# Patient Record
Sex: FEMALE | Race: WHITE | URBAN - METROPOLITAN AREA
[De-identification: names, ages, dates, MRNs, and addresses within clinical notes are randomized per-mention and may not be internally consistent; named-entity substitution may affect disease eponyms.]

---

## 2021-09-10 ENCOUNTER — OFFICE VISIT (OUTPATIENT)
Dept: URGENT CARE | Age: 71
End: 2021-09-10
Payer: MEDICARE

## 2021-09-10 VITALS — TEMPERATURE: 98 F | HEART RATE: 67 BPM | RESPIRATION RATE: 16 BRPM | OXYGEN SATURATION: 97 %

## 2021-09-10 DIAGNOSIS — R05.9 COUGH: ICD-10-CM

## 2021-09-10 DIAGNOSIS — R09.81 NASAL CONGESTION: Primary | ICD-10-CM

## 2021-09-10 DIAGNOSIS — J32.9 SINUSITIS, UNSPECIFIED CHRONICITY, UNSPECIFIED LOCATION: ICD-10-CM

## 2021-09-10 DIAGNOSIS — Z76.0 MEDICATION REFILL: ICD-10-CM

## 2021-09-10 PROCEDURE — G8427 DOCREV CUR MEDS BY ELIG CLIN: HCPCS | Performed by: NURSE PRACTITIONER

## 2021-09-10 PROCEDURE — 3017F COLORECTAL CA SCREEN DOC REV: CPT | Performed by: NURSE PRACTITIONER

## 2021-09-10 PROCEDURE — G8536 NO DOC ELDER MAL SCRN: HCPCS | Performed by: NURSE PRACTITIONER

## 2021-09-10 PROCEDURE — G8421 BMI NOT CALCULATED: HCPCS | Performed by: NURSE PRACTITIONER

## 2021-09-10 PROCEDURE — G8400 PT W/DXA NO RESULTS DOC: HCPCS | Performed by: NURSE PRACTITIONER

## 2021-09-10 PROCEDURE — 1090F PRES/ABSN URINE INCON ASSESS: CPT | Performed by: NURSE PRACTITIONER

## 2021-09-10 PROCEDURE — 99203 OFFICE O/P NEW LOW 30 MIN: CPT | Performed by: NURSE PRACTITIONER

## 2021-09-10 PROCEDURE — 1101F PT FALLS ASSESS-DOCD LE1/YR: CPT | Performed by: NURSE PRACTITIONER

## 2021-09-10 PROCEDURE — G8432 DEP SCR NOT DOC, RNG: HCPCS | Performed by: NURSE PRACTITIONER

## 2021-09-10 RX ORDER — METFORMIN HYDROCHLORIDE 1000 MG/1
1000 TABLET ORAL 2 TIMES DAILY WITH MEALS
COMMUNITY

## 2021-09-10 RX ORDER — ALBUTEROL SULFATE 90 UG/1
2 AEROSOL, METERED RESPIRATORY (INHALATION)
Qty: 1 EACH | Refills: 0 | Status: SHIPPED | OUTPATIENT
Start: 2021-09-10

## 2021-09-10 RX ORDER — AZITHROMYCIN 250 MG/1
TABLET, FILM COATED ORAL
Qty: 6 TABLET | Refills: 0 | Status: SHIPPED | OUTPATIENT
Start: 2021-09-10

## 2021-09-10 RX ORDER — ERGOCALCIFEROL 1.25 MG/1
50000 CAPSULE ORAL
COMMUNITY

## 2021-09-10 NOTE — PROGRESS NOTES
Subjective: (As above and below)       This patient was seen in [facility] at [address] [place] [address] Urgent Care while outdoors at their vehicle due to COVID-19 pandemic with PPE and focused examination in order to decrease community viral transmission. The patient/guardian gave verbal consent to treat. Chief Complaint   Patient presents with    Sinus Infection     Pt. denies exposure. Pt. stated she has sinus infection with runny nose and cough starting 3days. Bambi Bird is a 70 y.o. female who presents for evaluation of: nasal congestion, sinus pressure and infrequent cough. \"need a z pack for my sinus infection\" this is the only thing that works in past. Denies SOB, fever chills or wheezing. Additionally requesting med refill of albuterol inhaler  Appears last prescribed in the year 2020. No hx of asthma but occasionally gets wheezy when sick so would like to have it available if she needs it. Has tolerated medication well in past without any SE/AE or concerns    Recent travel: no  Known Exposure to COVID-19: no  Known flu or strep contact: no    Currently not vaccinated for covid       Review of Systems - negative except as listed above    Reviewed PmHx, RxHx, FmHx, SocHx, AllgHx and updated in chart. History reviewed. No pertinent family history.     Past Medical History:   Diagnosis Date    Diabetes [facility])       Social History     Socioeconomic History    Marital status: UNKNOWN     Spouse name: Not on file    Number of children: Not on file    Years of education: Not on file    Highest education level: Not on file   Tobacco Use    Smoking status: Never Smoker    Smokeless tobacco: Never Used     Social Determinants of Health     Financial Resource Strain:     Difficulty of Paying Living Expenses:    Food Insecurity:     Worried About Running Out of Food in the Last Year:     [address] in the Last Year:    Transportation Needs:     Lack of Transportation (Medical):  Lack of Transportation (Non-Medical):    Physical Activity:     Days of Exercise per Week:     Minutes of Exercise per Session:    Stress:     Feeling of Stress :    Social Connections:     Frequency of Communication with Friends and Family:     Frequency of Social Gatherings with Friends and Family:     Attends [employer] Services:     Active Member of Clubs or Organizations:     Attends Club or Organization Meetings:     Marital Status:           Current Outpatient Medications   Medication Sig    ergocalciferol (Vitamin D2) 1,250 mcg (50,000 unit) capsule Take 50,000 Units by mouth.  metFORMIN (GLUCOPHAGE) 1,000 mg tablet Take 1,000 mg by mouth two (2) times daily (with meals).  azithromycin (ZITHROMAX) 250 mg tablet Take 2 tablets on day 1 then 1 tablet per day for remaining 4 days. Take by mouth.  albuterol (PROVENTIL HFA, VENTOLIN HFA, PROAIR HFA) 90 mcg/actuation inhaler Take 2 Puffs by inhalation every six (6) hours as needed for Wheezing. No current facility-administered medications for this visit. Objective:     Vitals:    09/10/21 1119   Pulse: 67   Resp: 16   Temp: 98 °F (36.7 °C)   SpO2: 97%       Physical Exam  General appearance  appears well hydrated and does not appear toxic, no acute distress  Eyes - EOMs intact. Non injected. No scleral icterus   Ears - no external swelling  Nose - nasal congestion. No purulent drainage  Mouth - OP clear without swelling, exudate or lesion. Mucus membranes moist. Uvula midline. Neck/Lymphatics  trachea midline, full AROM  Chest - Normal breathing effort no wheeze rales, rhonchi or diminishments bilaterally. Heart - RRR, no murmurs  Skin - no observable rashes or pallor  Neurologic- alert and oriented x 3  Psychiatric- normal mood, behavior and though content. Assessment/ Plan:     1. Nasal congestion    - azithromycin (ZITHROMAX) 250 mg tablet; Take 2 tablets on day 1 then 1 tablet per day for remaining 4 days.   Take by mouth.  Dispense: 6 Tablet; Refill: 0  - NOVEL CORONAVIRUS (COVID-19); Future  - NOVEL CORONAVIRUS (COVID-19)    2. Cough      3. Sinusitis, unspecified chronicity, unspecified location    - azithromycin (ZITHROMAX) 250 mg tablet; Take 2 tablets on day 1 then 1 tablet per day for remaining 4 days. Take by mouth. Dispense: 6 Tablet; Refill: 0  - NOVEL CORONAVIRUS (COVID-19); Future  - NOVEL CORONAVIRUS (COVID-19)    4. Medication refill    - albuterol (PROVENTIL HFA, VENTOLIN HFA, PROAIR HFA) 90 mcg/actuation inhaler; Take 2 Puffs by inhalation every six (6) hours as needed for Wheezing. Dispense: 1 Each; Refill: 0      Allergies, viral illness  Patient request for z pack for sinuses  Have advised covid 19 test as this could be viral sinusitis/covid symptoms  Maintain adequate  Fluid intake  Albuterol refilled for patient per request; no current wheezing with clear lungs and normal breathing effor.t    Follow up: We have reviewed worsening/concerning signs and symptoms that may warrant seeking immediate care in the ED setting. For other non-severe changes, non-improvement or questions, patient aware to contact PCP office or consider a virtual online medical consultation.         Dequan Dominguez NP

## 2021-09-12 LAB
SARS-COV-2, NAA 2 DAY TAT: NORMAL
SARS-COV-2, NAA: NOT DETECTED

## 2023-05-15 RX ORDER — ERGOCALCIFEROL 1.25 MG/1
50000 CAPSULE ORAL
COMMUNITY

## 2023-05-15 RX ORDER — ALBUTEROL SULFATE 90 UG/1
2 AEROSOL, METERED RESPIRATORY (INHALATION) EVERY 6 HOURS PRN
COMMUNITY
Start: 2021-09-10

## 2023-05-15 RX ORDER — AZITHROMYCIN 250 MG/1
TABLET, FILM COATED ORAL
COMMUNITY
Start: 2021-09-10